# Patient Record
Sex: FEMALE | Race: WHITE
[De-identification: names, ages, dates, MRNs, and addresses within clinical notes are randomized per-mention and may not be internally consistent; named-entity substitution may affect disease eponyms.]

---

## 2019-09-13 ENCOUNTER — HOSPITAL ENCOUNTER (EMERGENCY)
Dept: HOSPITAL 54 - ER | Age: 29
Discharge: HOME | End: 2019-09-13
Payer: SELF-PAY

## 2019-09-13 VITALS — BODY MASS INDEX: 28.51 KG/M2 | WEIGHT: 167 LBS | HEIGHT: 64 IN

## 2019-09-13 VITALS — SYSTOLIC BLOOD PRESSURE: 108 MMHG | DIASTOLIC BLOOD PRESSURE: 75 MMHG

## 2019-09-13 DIAGNOSIS — Z60.2: ICD-10-CM

## 2019-09-13 DIAGNOSIS — R11.0: ICD-10-CM

## 2019-09-13 DIAGNOSIS — Z88.9: ICD-10-CM
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
## 2019-09-13 DIAGNOSIS — R51: ICD-10-CM

## 2019-09-13 DIAGNOSIS — R50.9: ICD-10-CM

## 2019-09-13 DIAGNOSIS — N12: Primary | ICD-10-CM

## 2019-09-13 DIAGNOSIS — F10.10: ICD-10-CM

## 2019-09-13 LAB
ALBUMIN SERPL BCP-MCNC: 3.7 G/DL (ref 3.4–5)
ALP SERPL-CCNC: 82 U/L (ref 46–116)
ALT SERPL W P-5'-P-CCNC: 18 U/L (ref 12–78)
AST SERPL W P-5'-P-CCNC: 13 U/L (ref 15–37)
BASOPHILS # BLD AUTO: 0 /CMM (ref 0–0.2)
BASOPHILS NFR BLD AUTO: 0.6 % (ref 0–2)
BILIRUB DIRECT SERPL-MCNC: 0.1 MG/DL (ref 0–0.2)
BILIRUB SERPL-MCNC: 0.2 MG/DL (ref 0.2–1)
BUN SERPL-MCNC: 9 MG/DL (ref 7–18)
CALCIUM SERPL-MCNC: 8.7 MG/DL (ref 8.5–10.1)
CHLORIDE SERPL-SCNC: 106 MMOL/L (ref 98–107)
CO2 SERPL-SCNC: 29 MMOL/L (ref 21–32)
CREAT SERPL-MCNC: 0.7 MG/DL (ref 0.6–1.3)
EOSINOPHIL NFR BLD AUTO: 1.1 % (ref 0–6)
GLUCOSE SERPL-MCNC: 87 MG/DL (ref 74–106)
HCT VFR BLD AUTO: 41 % (ref 33–45)
HGB BLD-MCNC: 13.7 G/DL (ref 11.5–14.8)
LYMPHOCYTES NFR BLD AUTO: 1 /CMM (ref 0.8–4.8)
LYMPHOCYTES NFR BLD AUTO: 25.8 % (ref 20–44)
MCHC RBC AUTO-ENTMCNC: 33 G/DL (ref 31–36)
MCV RBC AUTO: 81 FL (ref 82–100)
MONOCYTES NFR BLD AUTO: 0.4 /CMM (ref 0.1–1.3)
MONOCYTES NFR BLD AUTO: 9.6 % (ref 2–12)
NEUTROPHILS # BLD AUTO: 2.3 /CMM (ref 1.8–8.9)
NEUTROPHILS NFR BLD AUTO: 62.9 % (ref 43–81)
PH UR STRIP: 6 [PH] (ref 5–8)
PLATELET # BLD AUTO: 219 /CMM (ref 150–450)
POTASSIUM SERPL-SCNC: 3.4 MMOL/L (ref 3.5–5.1)
PROT SERPL-MCNC: 7.4 G/DL (ref 6.4–8.2)
RBC # BLD AUTO: 5.11 MIL/UL (ref 4–5.2)
RBC #/AREA URNS HPF: (no result) /HPF (ref 0–2)
SODIUM SERPL-SCNC: 142 MMOL/L (ref 136–145)
UROBILINOGEN UR STRIP-MCNC: 0.2 EU/DL
WBC #/AREA URNS HPF: (no result) /HPF (ref 0–3)
WBC NRBC COR # BLD AUTO: 3.7 K/UL (ref 4.3–11)

## 2019-09-13 PROCEDURE — 99283 EMERGENCY DEPT VISIT LOW MDM: CPT

## 2019-09-13 PROCEDURE — 80076 HEPATIC FUNCTION PANEL: CPT

## 2019-09-13 PROCEDURE — 80048 BASIC METABOLIC PNL TOTAL CA: CPT

## 2019-09-13 PROCEDURE — 36415 COLL VENOUS BLD VENIPUNCTURE: CPT

## 2019-09-13 PROCEDURE — 87086 URINE CULTURE/COLONY COUNT: CPT

## 2019-09-13 PROCEDURE — 81001 URINALYSIS AUTO W/SCOPE: CPT

## 2019-09-13 PROCEDURE — 87077 CULTURE AEROBIC IDENTIFY: CPT

## 2019-09-13 PROCEDURE — 96375 TX/PRO/DX INJ NEW DRUG ADDON: CPT

## 2019-09-13 PROCEDURE — 84703 CHORIONIC GONADOTROPIN ASSAY: CPT

## 2019-09-13 PROCEDURE — 85025 COMPLETE CBC W/AUTO DIFF WBC: CPT

## 2019-09-13 PROCEDURE — 96374 THER/PROPH/DIAG INJ IV PUSH: CPT

## 2019-09-13 PROCEDURE — 87186 SC STD MICRODIL/AGAR DIL: CPT

## 2019-09-13 SDOH — SOCIAL STABILITY - SOCIAL INSECURITY: PROBLEMS RELATED TO LIVING ALONE: Z60.2

## 2019-09-13 NOTE — NUR
FEVER W/ HA & NAUSEA. PATIENT A/OX4, BREATHING EVEN AND UNLABORED, NO SOB 
NOTED, ATTACHED TO THE MONITOR. CHANGED INTO GOWN. NEEDS ATTENDED.